# Patient Record
Sex: FEMALE
[De-identification: names, ages, dates, MRNs, and addresses within clinical notes are randomized per-mention and may not be internally consistent; named-entity substitution may affect disease eponyms.]

---

## 2020-06-01 ENCOUNTER — HOSPITAL ENCOUNTER (OUTPATIENT)
Dept: HOSPITAL 76 - LAB.WCP | Age: 35
Discharge: HOME | End: 2020-06-01
Attending: ADVANCED PRACTICE MIDWIFE
Payer: COMMERCIAL

## 2020-06-01 DIAGNOSIS — Z36.8A: ICD-10-CM

## 2020-06-01 DIAGNOSIS — Z34.90: Primary | ICD-10-CM

## 2020-06-01 LAB
AMPHET UR QL SCN: NEGATIVE
BASOPHILS NFR BLD AUTO: 0 10^3/UL (ref 0–0.1)
BASOPHILS NFR BLD AUTO: 0.5 %
BENZODIAZ UR QL SCN: NEGATIVE
CLARITY UR REFRACT.AUTO: CLEAR
COCAINE UR-SCNC: NEGATIVE UMOL/L
EOSINOPHIL # BLD AUTO: 0.1 10^3/UL (ref 0–0.7)
EOSINOPHIL NFR BLD AUTO: 1.2 %
ERYTHROCYTE [DISTWIDTH] IN BLOOD BY AUTOMATED COUNT: 12.8 % (ref 12–15)
GLUCOSE UR QL STRIP.AUTO: NEGATIVE MG/DL
HGB UR QL STRIP: 12.8 G/DL (ref 12–16)
KETONES UR QL STRIP.AUTO: 15 MG/DL
LYMPHOCYTES # SPEC AUTO: 1.9 10^3/UL (ref 1.5–3.5)
LYMPHOCYTES NFR BLD AUTO: 31.1 %
MCH RBC QN AUTO: 33.9 PG (ref 27–31)
MCHC RBC AUTO-ENTMCNC: 32.6 G/DL (ref 32–36)
MCV RBC AUTO: 104 FL (ref 81–99)
METHADONE UR QL SCN: NEGATIVE
METHAMPHET UR QL SCN: NEGATIVE
MONOCYTES # BLD AUTO: 0.5 10^3/UL (ref 0–1)
MONOCYTES NFR BLD AUTO: 9 %
NEUTROPHILS # BLD AUTO: 3.5 10^3/UL (ref 1.5–6.6)
NEUTROPHILS # SNV AUTO: 6 X10^3/UL (ref 4.8–10.8)
NEUTROPHILS NFR BLD AUTO: 57.9 %
NITRITE UR QL STRIP.AUTO: NEGATIVE
OPIATES UR QL SCN: NEGATIVE
PDW BLD AUTO: 11.8 FL (ref 7.9–10.8)
PH UR STRIP.AUTO: 5 PH (ref 5–7.5)
PLATELET # BLD: 258 10^3/UL (ref 130–450)
PROT UR STRIP.AUTO-MCNC: NEGATIVE MG/DL
RBC # UR STRIP.AUTO: NEGATIVE /UL
RBC # URNS HPF: (no result) /HPF (ref 0–5)
RBC MAR: 3.78 10^6/UL (ref 4.2–5.4)
SP GR UR STRIP.AUTO: >=1.03 (ref 1–1.03)
SQUAMOUS URNS QL MICRO: (no result)
T VAGINALIS RRNA GENITAL QL PROBE: NEGATIVE
UROBILINOGEN UR QL STRIP.AUTO: (no result) E.U./DL
UROBILINOGEN UR STRIP.AUTO-MCNC: NEGATIVE MG/DL
VOLATILE DRUGS POS SERPL SCN: (no result)

## 2020-06-01 PROCEDURE — 81599 UNLISTED MAAA: CPT

## 2020-06-01 PROCEDURE — 87491 CHLMYD TRACH DNA AMP PROBE: CPT

## 2020-06-01 PROCEDURE — 86762 RUBELLA ANTIBODY: CPT

## 2020-06-01 PROCEDURE — 87086 URINE CULTURE/COLONY COUNT: CPT

## 2020-06-01 PROCEDURE — 87389 HIV-1 AG W/HIV-1&-2 AB AG IA: CPT

## 2020-06-01 PROCEDURE — 86850 RBC ANTIBODY SCREEN: CPT

## 2020-06-01 PROCEDURE — 86592 SYPHILIS TEST NON-TREP QUAL: CPT

## 2020-06-01 PROCEDURE — 87340 HEPATITIS B SURFACE AG IA: CPT

## 2020-06-01 PROCEDURE — 86900 BLOOD TYPING SEROLOGIC ABO: CPT

## 2020-06-01 PROCEDURE — 86803 HEPATITIS C AB TEST: CPT

## 2020-06-01 PROCEDURE — 87591 N.GONORRHOEAE DNA AMP PROB: CPT

## 2020-06-01 PROCEDURE — 86901 BLOOD TYPING SEROLOGIC RH(D): CPT

## 2020-06-01 PROCEDURE — 81001 URINALYSIS AUTO W/SCOPE: CPT

## 2020-06-01 PROCEDURE — 80306 DRUG TEST PRSMV INSTRMNT: CPT

## 2020-06-01 PROCEDURE — 36415 COLL VENOUS BLD VENIPUNCTURE: CPT

## 2020-06-01 PROCEDURE — 87661 TRICHOMONAS VAGINALIS AMPLIF: CPT

## 2020-06-01 PROCEDURE — 85025 COMPLETE CBC W/AUTO DIFF WBC: CPT

## 2020-06-02 LAB
HEPATITIS B SURFACE ANTIGEN: (no result)
HEPATITIS C ANTIBODY: (no result)
HIV AG/AB 4TH GEN: (no result)
SIGNAL TO CUT-OFF: 0 (ref ?–1)

## 2020-06-03 ENCOUNTER — HOSPITAL ENCOUNTER (OUTPATIENT)
Dept: HOSPITAL 76 - DI | Age: 35
Discharge: HOME | End: 2020-06-03
Attending: ADVANCED PRACTICE MIDWIFE
Payer: COMMERCIAL

## 2020-06-03 DIAGNOSIS — Z32.01: Primary | ICD-10-CM

## 2020-06-03 DIAGNOSIS — R93.89: ICD-10-CM

## 2020-06-03 PROCEDURE — 76817 TRANSVAGINAL US OBSTETRIC: CPT

## 2020-06-03 PROCEDURE — 76801 OB US < 14 WKS SINGLE FETUS: CPT

## 2020-06-03 NOTE — ULTRASOUND REPORT
Reason:  POSITIVE PREGNANCY TEST

Procedure Date:  06/03/2020   

Accession Number:  [id number] / [id number]                    

Procedure:  US  - OB First Trimester CPT Code:  

 

***Final Report***

 

 

FULL RESULT:

 

 

PROCEDURE:  OB First Trimester

 

INDICATIONS:  POSITIVE PREGNANCY TEST

 

OUTSIDE/PRIOR DATING DATA:

Last menstrual period (LMP): 4/26/2020.

LMP-based estimated date of delivery (MARISOL): 1/31/2021.

First dating scan (date and location): 6/3/2020.

Estimated date of delivery (MARISOL) from first dating scan: 1/31/2021.

 

TECHNIQUE:

Real-time scanning was performed of the fetus and maternal pelvic organs, 

with image documentation.

 

COMPARISON:  None.

 

FINDINGS:  Multiple grayscale and color images of the pelvis were 

acquired.

Visualized portions of the bilateral kidneys appear within normal limits. 

Trace free fluid is noted in the pelvis.

The maternal ovaries were not imaged. However, there is a 3.7 cm cystic 

structure superior to the uterus, unable to definitively characterize 

whether it originates from the ovary versus the uterus. Imaging limited 

secondary to patient imaging characteristics.

 

Embryo:  There is an intrauterine gestational sac measuring approximately 

0.7 cm which correlates with approximately 5 weeks and 3 days gestational 

age. No fetal cardiac activity visualized on today's exam. No 

perigestational hemorrhage. No yolk sac identified. No fetal pole 

visualized.

 

Measurement variability in dating:  +/- 4 weeks by LMP, +/- 7 days by 

mean sac diameter (use before 6 weeks gestation if crown-rump length not 

able to be measured), +/- 5 days by crown-rump length (6-12 weeks 

gestation).

 

 

IMPRESSION:

 

1. Single intrauterine gestation with an estimated sonographic 

gestational age of approximately 5 weeks and 3 days. No fetal pole or 

fetal cardiac activity visualized on today's examination. This is likely 

related to early gestational age and clinical follow-up with short 

interval follow-up imaging is recommended.

 

 

2. A 3.7 cm cystic structure superior to the uterus which is incompletely 

characterized secondary to patient imaging characteristics. It is 

difficult to determine whether its origin is off the ovary or the uterus. 

Recommend attention to this area on follow-up imaging.

 

Reviewed by: Jose Miner MD on 6/3/2020 12:07 PM PDT

Approved by: Jose Miner MD on 6/3/2020 12:07 PM PDT

 

 

Station ID:  [id number]

## 2020-06-03 NOTE — ULTRASOUND REPORT
Reason:  POSITIVE PREGNANCY TEST

Procedure Date:  06/03/2020   

Accession Number:  [id number] / [id number]                    

Procedure:  US  - OB Transvaginal CPT Code:  

 

***Final Report***

 

 

FULL RESULT:

 

 

PROCEDURE:  OB First Trimester

 

INDICATIONS:  POSITIVE PREGNANCY TEST

 

OUTSIDE/PRIOR DATING DATA:

Last menstrual period (LMP): 4/26/2020.

LMP-based estimated date of delivery (MARISOL): 1/31/2021.

First dating scan (date and location): 6/3/2020.

Estimated date of delivery (MARISOL) from first dating scan: 1/31/2021.

 

TECHNIQUE:

Real-time scanning was performed of the fetus and maternal pelvic organs, 

with image documentation.

 

COMPARISON:  None.

 

FINDINGS:  Multiple grayscale and color images of the pelvis were 

acquired.

Visualized portions of the bilateral kidneys appear within normal limits. 

Trace free fluid is noted in the pelvis.

The maternal ovaries were not imaged. However, there is a 3.7 cm cystic 

structure superior to the uterus, unable to definitively characterize 

whether it originates from the ovary versus the uterus. Imaging limited 

secondary to patient imaging characteristics.

 

Embryo:  There is an intrauterine gestational sac measuring approximately 

0.7 cm which correlates with approximately 5 weeks and 3 days gestational 

age. No fetal cardiac activity visualized on today's exam. No 

perigestational hemorrhage. No yolk sac identified. No fetal pole 

visualized.

 

Measurement variability in dating:  +/- 4 weeks by LMP, +/- 7 days by 

mean sac diameter (use before 6 weeks gestation if crown-rump length not 

able to be measured), +/- 5 days by crown-rump length (6-12 weeks 

gestation).

 

 

IMPRESSION:

 

1. Single intrauterine gestation with an estimated sonographic 

gestational age of approximately 5 weeks and 3 days. No fetal pole or 

fetal cardiac activity visualized on today's examination. This is likely 

related to early gestational age and clinical follow-up with short 

interval follow-up imaging is recommended.

 

 

2. A 3.7 cm cystic structure superior to the uterus which is incompletely 

characterized secondary to patient imaging characteristics. It is 

difficult to determine whether its origin is off the ovary or the uterus. 

Recommend attention to this area on follow-up imaging.

 

Reviewed by: Jose Miner MD on 6/3/2020 12:09 PM PDT

Approved by: Jose Miner MD on 6/3/2020 12:09 PM PDT

 

 

Station ID:  [id number]

## 2020-06-04 ENCOUNTER — HOSPITAL ENCOUNTER (OUTPATIENT)
Dept: HOSPITAL 76 - LAB.WCP | Age: 35
Discharge: HOME | End: 2020-06-04
Attending: ADVANCED PRACTICE MIDWIFE
Payer: COMMERCIAL

## 2020-06-04 DIAGNOSIS — Z34.90: Primary | ICD-10-CM

## 2020-06-04 LAB
CLARITY UR REFRACT.AUTO: CLEAR
GLUCOSE UR QL STRIP.AUTO: NEGATIVE MG/DL
KETONES UR QL STRIP.AUTO: NEGATIVE MG/DL
NITRITE UR QL STRIP.AUTO: NEGATIVE
PH UR STRIP.AUTO: 5 PH (ref 5–7.5)
PROT UR STRIP.AUTO-MCNC: NEGATIVE MG/DL
RBC # UR STRIP.AUTO: NEGATIVE /UL
SP GR UR STRIP.AUTO: 1.01 (ref 1–1.03)
SQUAMOUS URNS QL MICRO: (no result)
UROBILINOGEN UR QL STRIP.AUTO: (no result) E.U./DL
UROBILINOGEN UR STRIP.AUTO-MCNC: NEGATIVE MG/DL

## 2020-06-04 PROCEDURE — 81001 URINALYSIS AUTO W/SCOPE: CPT

## 2020-06-04 PROCEDURE — 87086 URINE CULTURE/COLONY COUNT: CPT

## 2020-12-30 ENCOUNTER — HOSPITAL ENCOUNTER (OUTPATIENT)
Dept: HOSPITAL 76 - COV | Age: 35
Discharge: HOME | End: 2020-12-30
Attending: FAMILY MEDICINE
Payer: MEDICAID

## 2020-12-30 DIAGNOSIS — Z20.828: Primary | ICD-10-CM
